# Patient Record
Sex: MALE | Race: WHITE | Employment: OTHER | URBAN - METROPOLITAN AREA
[De-identification: names, ages, dates, MRNs, and addresses within clinical notes are randomized per-mention and may not be internally consistent; named-entity substitution may affect disease eponyms.]

---

## 2017-02-28 PROBLEM — M48.061 SPINAL STENOSIS OF LUMBAR REGION: Status: ACTIVE | Noted: 2017-02-28

## 2017-02-28 PROBLEM — S72.009D CLOSED FRACTURE OF NECK OF FEMUR WITH ROUTINE HEALING: Status: ACTIVE | Noted: 2017-02-28

## 2017-02-28 PROBLEM — S72.142D CLOSED DISPLACED INTERTROCHANTERIC FRACTURE OF LEFT FEMUR WITH ROUTINE HEALING: Status: ACTIVE | Noted: 2017-02-28

## 2017-02-28 PROBLEM — M17.12 PRIMARY OSTEOARTHRITIS OF LEFT KNEE: Status: ACTIVE | Noted: 2017-02-28

## 2017-03-09 PROBLEM — M51.26 BULGING OF LUMBAR INTERVERTEBRAL DISC: Status: ACTIVE | Noted: 2017-03-09

## 2017-03-09 PROBLEM — M51.36 BULGING OF LUMBAR INTERVERTEBRAL DISC: Status: ACTIVE | Noted: 2017-03-09

## 2017-03-30 ENCOUNTER — TELEPHONE (OUTPATIENT)
Dept: PAIN CLINIC | Facility: HOSPITAL | Age: 82
End: 2017-03-30

## 2017-03-30 ENCOUNTER — OFFICE VISIT (OUTPATIENT)
Dept: PAIN CLINIC | Facility: HOSPITAL | Age: 82
End: 2017-03-30
Attending: ANESTHESIOLOGY
Payer: MEDICARE

## 2017-03-30 VITALS
SYSTOLIC BLOOD PRESSURE: 115 MMHG | HEART RATE: 82 BPM | WEIGHT: 148 LBS | DIASTOLIC BLOOD PRESSURE: 60 MMHG | BODY MASS INDEX: 21.92 KG/M2 | HEIGHT: 69 IN

## 2017-03-30 DIAGNOSIS — M48.061 LUMBAR SPINAL STENOSIS: Primary | ICD-10-CM

## 2017-03-30 PROCEDURE — 99201 HC OUTPT EVAL AND MGNT NEW PT LEVEL 1: CPT | Performed by: NURSE PRACTITIONER

## 2017-03-30 NOTE — CHRONIC PAIN
Initial Consultation Note      HISTORY OF PRESENT ILLNESS:  Darron Hamman is a 80year old old male referred to the pain clinic by Dr. Azael Ritchie for lower extremity pain.   Patient reports he has lower extremity discomfort or \"tiredness\" when he walks kevin pain or palpitations   Respiratory:  No current shortness of breath   Gastrointestinal:  No active ulcer  Genitourinary:  Negative  Integumentary :  Negative  Psychiatric:  Negative  Hematologic: No active bleeding  Lymphatic: No current lymphedema  Allerg non-tender  Gait: Broad-based; cane user - Yes  Spine: Kyphosis          MOTOR EXAMINATION    LOWER EXTREMITY      LEFT RIGHT   Iliopsoas 5/5 5/5   Quadriceps 5/5 5/5   Foot DF 5/5 5/5   Foot EHL 5/5 5/5   Gastrocnemius 5/5 5/5   SLR: negative  SIJ tendern  09/23/2014   MPV 9.9 09/23/2014       Lab Results  Component Value Date    09/23/2014   K 4.8 09/23/2014    09/23/2014   CO2 31 09/23/2014   BUN 8 09/23/2014   * 09/23/2014   CA 9.2 09/23/2014     No results found for: PT    IL

## 2017-03-30 NOTE — PROGRESS NOTES
03/30/17:  Patient here as a new patient, ambulating with a cane for c/o lower extremity discomfort when he walks long distances. Seen by Dr. Guero Cobb (see dictation). Patient was not sure what medications he was taking.   This RN called [employer] (

## 2017-05-01 ENCOUNTER — TELEPHONE (OUTPATIENT)
Dept: PAIN CLINIC | Facility: HOSPITAL | Age: 82
End: 2017-05-01

## 2017-05-01 NOTE — TELEPHONE ENCOUNTER
I will route 's response below to Blanca Skelton who sent message to [facility] requesting clearance for patient to hold of Xarelto before epidural

## 2017-05-01 NOTE — TELEPHONE ENCOUNTER
I have not seen Mr Tanika Mcgarry in about 3 yrs - would need to get clearance from his cardiologist or whomever else gave him Xarelto

## 2017-08-14 ENCOUNTER — OFFICE VISIT (OUTPATIENT)
Dept: INTERNAL MEDICINE CLINIC | Facility: CLINIC | Age: 82
End: 2017-08-14

## 2017-08-14 ENCOUNTER — LAB ENCOUNTER (OUTPATIENT)
Dept: LAB | Age: 82
End: 2017-08-14
Attending: INTERNAL MEDICINE
Payer: MEDICARE

## 2017-08-14 VITALS
BODY MASS INDEX: 23.67 KG/M2 | WEIGHT: 159.81 LBS | HEART RATE: 95 BPM | DIASTOLIC BLOOD PRESSURE: 78 MMHG | TEMPERATURE: 98 F | OXYGEN SATURATION: 97 % | HEIGHT: 69 IN | SYSTOLIC BLOOD PRESSURE: 130 MMHG

## 2017-08-14 DIAGNOSIS — R29.898 WEAKNESS OF BOTH LEGS: ICD-10-CM

## 2017-08-14 DIAGNOSIS — M48.061 SPINAL STENOSIS OF LUMBAR REGION: ICD-10-CM

## 2017-08-14 DIAGNOSIS — E78.00 HYPERCHOLESTEROLEMIA: ICD-10-CM

## 2017-08-14 DIAGNOSIS — R29.898 WEAKNESS OF BOTH LOWER EXTREMITIES: Primary | ICD-10-CM

## 2017-08-14 DIAGNOSIS — M40.204 KYPHOSIS OF THORACIC REGION, UNSPECIFIED KYPHOSIS TYPE: ICD-10-CM

## 2017-08-14 DIAGNOSIS — I48.20 CHRONIC ATRIAL FIBRILLATION (HCC): ICD-10-CM

## 2017-08-14 DIAGNOSIS — R29.898 WEAKNESS OF BOTH LOWER EXTREMITIES: ICD-10-CM

## 2017-08-14 DIAGNOSIS — E78.00 HYPERCHOLESTEREMIA: ICD-10-CM

## 2017-08-14 LAB
ALBUMIN SERPL BCP-MCNC: 4 G/DL (ref 3.5–4.8)
ALBUMIN/GLOB SERPL: 1.2 {RATIO} (ref 1–2)
ALP SERPL-CCNC: 61 U/L (ref 32–100)
ALT SERPL-CCNC: 15 U/L (ref 17–63)
ANION GAP SERPL CALC-SCNC: 6 MMOL/L (ref 0–18)
AST SERPL-CCNC: 25 U/L (ref 15–41)
BASOPHILS # BLD: 0 K/UL (ref 0–0.2)
BASOPHILS NFR BLD: 1 %
BILIRUB SERPL-MCNC: 1.3 MG/DL (ref 0.3–1.2)
BILIRUB UR QL: NEGATIVE
BUN SERPL-MCNC: 12 MG/DL (ref 8–20)
BUN/CREAT SERPL: 16 (ref 10–20)
CALCIUM SERPL-MCNC: 9.1 MG/DL (ref 8.5–10.5)
CHLORIDE SERPL-SCNC: 98 MMOL/L (ref 95–110)
CHOLEST SERPL-MCNC: 224 MG/DL (ref 110–200)
CLARITY UR: CLEAR
CO2 SERPL-SCNC: 29 MMOL/L (ref 22–32)
COLOR UR: YELLOW
CREAT SERPL-MCNC: 0.75 MG/DL (ref 0.5–1.5)
EOSINOPHIL # BLD: 0.2 K/UL (ref 0–0.7)
EOSINOPHIL NFR BLD: 3 %
ERYTHROCYTE [DISTWIDTH] IN BLOOD BY AUTOMATED COUNT: 14.1 % (ref 11–15)
GLOBULIN PLAS-MCNC: 3.3 G/DL (ref 2.5–3.7)
GLUCOSE SERPL-MCNC: 100 MG/DL (ref 70–99)
GLUCOSE UR-MCNC: NEGATIVE MG/DL
HCT VFR BLD AUTO: 43.4 % (ref 41–52)
HDLC SERPL-MCNC: 55 MG/DL
HGB BLD-MCNC: 14.5 G/DL (ref 13.5–17.5)
HGB UR QL STRIP.AUTO: NEGATIVE
KETONES UR-MCNC: NEGATIVE MG/DL
LDLC SERPL CALC-MCNC: 159 MG/DL (ref 0–99)
LEUKOCYTE ESTERASE UR QL STRIP.AUTO: NEGATIVE
LYMPHOCYTES # BLD: 1.2 K/UL (ref 1–4)
LYMPHOCYTES NFR BLD: 20 %
MAGNESIUM SERPL-MCNC: 2 MG/DL (ref 1.8–2.5)
MCH RBC QN AUTO: 29.7 PG (ref 27–32)
MCHC RBC AUTO-ENTMCNC: 33.3 G/DL (ref 32–37)
MCV RBC AUTO: 89.2 FL (ref 80–100)
MONOCYTES # BLD: 0.9 K/UL (ref 0–1)
MONOCYTES NFR BLD: 15 %
NEUTROPHILS # BLD AUTO: 3.6 K/UL (ref 1.8–7.7)
NEUTROPHILS NFR BLD: 61 %
NITRITE UR QL STRIP.AUTO: NEGATIVE
NONHDLC SERPL-MCNC: 169 MG/DL
OSMOLALITY UR CALC.SUM OF ELEC: 276 MOSM/KG (ref 275–295)
PH UR: 6 [PH] (ref 5–8)
PLATELET # BLD AUTO: 126 K/UL (ref 140–400)
PMV BLD AUTO: 10.3 FL (ref 7.4–10.3)
POTASSIUM SERPL-SCNC: 5.2 MMOL/L (ref 3.3–5.1)
PROT SERPL-MCNC: 7.3 G/DL (ref 5.9–8.4)
PROT UR-MCNC: NEGATIVE MG/DL
RBC # BLD AUTO: 4.87 M/UL (ref 4.5–5.9)
RBC #/AREA URNS AUTO: 2 /HPF
RBC #/AREA URNS AUTO: 2 /HPF
SODIUM SERPL-SCNC: 133 MMOL/L (ref 136–144)
SP GR UR STRIP: 1.01 (ref 1–1.03)
TRIGL SERPL-MCNC: 49 MG/DL (ref 1–149)
TSH SERPL-ACNC: 4.32 UIU/ML (ref 0.45–5.33)
UROBILINOGEN UR STRIP-ACNC: 2
VIT C UR-MCNC: 20 MG/DL
WBC # BLD AUTO: 6 K/UL (ref 4–11)
WBC #/AREA URNS AUTO: <1 /HPF
WBC #/AREA URNS AUTO: <1 /HPF

## 2017-08-14 PROCEDURE — 99214 OFFICE O/P EST MOD 30 MIN: CPT | Performed by: INTERNAL MEDICINE

## 2017-08-14 PROCEDURE — 80061 LIPID PANEL: CPT

## 2017-08-14 PROCEDURE — 85025 COMPLETE CBC W/AUTO DIFF WBC: CPT

## 2017-08-14 PROCEDURE — 36415 COLL VENOUS BLD VENIPUNCTURE: CPT

## 2017-08-14 PROCEDURE — 83735 ASSAY OF MAGNESIUM: CPT

## 2017-08-14 PROCEDURE — 84443 ASSAY THYROID STIM HORMONE: CPT

## 2017-08-14 PROCEDURE — 82607 VITAMIN B-12: CPT

## 2017-08-14 PROCEDURE — 80053 COMPREHEN METABOLIC PANEL: CPT

## 2017-08-14 PROCEDURE — 81015 MICROSCOPIC EXAM OF URINE: CPT

## 2017-08-14 PROCEDURE — G0463 HOSPITAL OUTPT CLINIC VISIT: HCPCS | Performed by: INTERNAL MEDICINE

## 2017-08-14 PROCEDURE — 81001 URINALYSIS AUTO W/SCOPE: CPT

## 2017-08-14 RX ORDER — METOPROLOL SUCCINATE 25 MG/1
25 TABLET, EXTENDED RELEASE ORAL DAILY
COMMUNITY
Start: 2017-07-27 | End: 2018-03-14

## 2017-08-14 NOTE — PROGRESS NOTES
Herman Rasmussen is a 80year old male. HPI:    Mr Red Cody returns here to see me - Yanet Nurse not seen him since 2014 when he fell and fractured his left hip and was hospitalized at [facility].  He recuperated well from this and both he and his wife (who has now develope mouth daily with food. Disp:  Rfl:    Psyllium (METAMUCIL OR) Take 1 teaspoon daily mixed with water Disp:  Rfl:    docusate sodium (COLACE) 50 MG Oral Cap Take 50 mg by mouth daily.  Disp:  Rfl:    Atorvastatin Calcium (LIPITOR) 10 MG Oral Tab TAKE ONE TAB motor strength of his quads, hamstrings, extensors and flexors of the knees and ankles. ASSESSMENT AND PLAN:   1.  Weakness of both lower extremities - this is most likely due to spinal stenosis but is not extreme on clinical exam. He has shuffling gait

## 2017-08-15 ENCOUNTER — TELEPHONE (OUTPATIENT)
Dept: INTERNAL MEDICINE CLINIC | Facility: CLINIC | Age: 82
End: 2017-08-15

## 2017-08-15 DIAGNOSIS — R29.898 WEAKNESS OF BOTH LOWER EXTREMITIES: Primary | ICD-10-CM

## 2017-08-15 DIAGNOSIS — E78.00 HYPERCHOLESTEREMIA: ICD-10-CM

## 2017-08-15 LAB — VIT B12 SERPL-MCNC: 263 PG/ML (ref 181–914)

## 2017-08-15 NOTE — TELEPHONE ENCOUNTER
Vitamin b12 level added on to specimen from yesterday. Spoke to [place] in reference lab to make sure the specimen gets added on.

## 2017-08-15 NOTE — TELEPHONE ENCOUNTER
Patient called back to verify message. Reiterated Dr. Cornel Shukla message. Patient verbalized understanding. He states he will NOT be going to Dr. Aguilar. He will go back to Dr. Juan Jose Lira and plan to continue PT under his care. To Dr. Young Hathaway.

## 2017-08-15 NOTE — TELEPHONE ENCOUNTER
This is fine. He can continue with Dr Phuong Mays and I think that even though he has done PT for two yrs that it may be beneficial to prevent any falling. If he needs something have him call us.

## 2017-08-15 NOTE — TELEPHONE ENCOUNTER
Called patient and relayed Dr Stanley Lazcano message. Patient verbalized understanding. Patient stated he wanted to talk to Dr Ольга Bray. Asked patient if I could relay a message to him.  Patient wants to let Dr Ольга Bray know that yesterday he went down to Dr Hermine Boas o

## 2017-08-15 NOTE — TELEPHONE ENCOUNTER
Can you see if they can add a B 12 level to blood drawn yesterday? If not, I would like him to get drawn.

## 2017-08-25 ENCOUNTER — TELEPHONE (OUTPATIENT)
Dept: INTERNAL MEDICINE CLINIC | Facility: CLINIC | Age: 82
End: 2017-08-25

## 2017-08-25 NOTE — TELEPHONE ENCOUNTER
I phoned Mr Dorothy Riley with low B 12 level - he will start oral B 12 1000 micrograms daily and get repeat B 12 level in 2 months.

## 2017-08-28 ENCOUNTER — TELEPHONE (OUTPATIENT)
Dept: INTERNAL MEDICINE CLINIC | Facility: CLINIC | Age: 82
End: 2017-08-28

## 2017-09-06 ENCOUNTER — TELEPHONE (OUTPATIENT)
Dept: INTERNAL MEDICINE CLINIC | Facility: CLINIC | Age: 82
End: 2017-09-06

## 2017-09-08 RX ORDER — MELATONIN
1000 DAILY
Qty: 90 TABLET | Refills: 3 | Status: SHIPPED | OUTPATIENT
Start: 2017-09-08 | End: 2018-02-13

## 2017-09-08 NOTE — TELEPHONE ENCOUNTER
Dr. Lala Prudent message relayed to pt who verbalized understanding. Pt prefers to get all medication thru pharmacy. B12 eRx'd.

## 2017-09-15 ENCOUNTER — OFFICE VISIT (OUTPATIENT)
Dept: NEUROLOGY | Facility: CLINIC | Age: 82
End: 2017-09-15

## 2017-09-15 DIAGNOSIS — M54.16 LUMBAR RADICULOPATHY: Primary | ICD-10-CM

## 2017-09-15 PROCEDURE — 95886 MUSC TEST DONE W/N TEST COMP: CPT | Performed by: OTHER

## 2017-09-15 PROCEDURE — 95908 NRV CNDJ TST 3-4 STUDIES: CPT | Performed by: OTHER

## 2017-09-15 NOTE — PROCEDURES
[address]  Phone: [phone]  Fax: [phone] REPORT          Patient: Lien Heard YOB: 1925  Patient ID: [id number] Hand Dominance: addi MRI of the lumbar spine report reviewed    [facility] conduction study reviewed a distal axonal sensorimotor peripheral neuropathy. No evidence for a left lumbar sacral radiculopathy, myopathy, mononeuropathy, plexopathy.                      Motor NCS

## 2017-09-26 PROBLEM — G62.89 PERIPHERAL AXONAL NEUROPATHY: Status: ACTIVE | Noted: 2017-09-26

## 2017-10-05 ENCOUNTER — TELEPHONE (OUTPATIENT)
Dept: NEUROLOGY | Facility: CLINIC | Age: 82
End: 2017-10-05

## 2017-10-05 NOTE — TELEPHONE ENCOUNTER
Referral for consult and EMG in pending status.  Confirm consult ordered  Office closed until 2pm for lunch

## 2017-10-06 ENCOUNTER — OFFICE VISIT (OUTPATIENT)
Dept: NEUROLOGY | Facility: CLINIC | Age: 82
End: 2017-10-06

## 2017-10-06 DIAGNOSIS — M54.16 LUMBAR RADICULOPATHY: ICD-10-CM

## 2017-10-06 DIAGNOSIS — G62.9 NEUROPATHY: Primary | ICD-10-CM

## 2017-10-06 PROCEDURE — 95886 MUSC TEST DONE W/N TEST COMP: CPT | Performed by: OTHER

## 2017-10-06 PROCEDURE — 95908 NRV CNDJ TST 3-4 STUDIES: CPT | Performed by: OTHER

## 2017-10-06 NOTE — PROCEDURES
[address]  Phone: [phone]  Fax: [phone] REPORT          Patient: Nidia Harrington YOB: 1925  Patient ID: [id number] Hand Dominance: addi EMG–nerve conduction study of the right lower extremity confirms that distal axonal sensorimotor peripheral neuropathy. No evidence for right lumbar sacral radiculopathy. No evidence for myopathy, mononeuropathy.     I recommend that he follow-up with

## 2017-10-10 ENCOUNTER — TELEPHONE (OUTPATIENT)
Dept: INTERNAL MEDICINE CLINIC | Facility: CLINIC | Age: 82
End: 2017-10-10

## 2017-10-10 DIAGNOSIS — E03.9 ACQUIRED HYPOTHYROIDISM: ICD-10-CM

## 2017-10-10 DIAGNOSIS — G62.9 PERIPHERAL POLYNEUROPATHY: Primary | ICD-10-CM

## 2017-10-10 NOTE — TELEPHONE ENCOUNTER
Dr Larisa Felder did an analysis of right leg muscle on 10/5, Dr Larisa Felder sent results to Dr CASTRO to review.     Pt is checking to see if the results are received pt would like to discuss these results,   please call pt [phone]   Tasked to nursing

## 2017-10-11 NOTE — TELEPHONE ENCOUNTER
Pt notified EMG from DR Dario Amos shows neuropathy  No etiology  / DR ROCHA   EMG shows fair amount of arthritis LS spine   Some spinal stenosis/ DR ROCHA  To have labwork done / dr Venice Mason  Pt to get labs th/ or FR   Will call and make appt to disc   All tests soon

## 2017-10-11 NOTE — TELEPHONE ENCOUNTER
EMG report reviewed from Dr. Collette Valdes. He noted that patient has a neuropathy. Recommended some blood work that may contribute to this. Labs in place. Indicated that the EMG results do not give the etiology for his walking problem. I also noted that there is an MRI report from Dr. Martine Velasco that shows a fair amount of arthritis in the lumbar spine. Also some spinal stenosis.

## 2017-10-12 ENCOUNTER — LAB ENCOUNTER (OUTPATIENT)
Dept: LAB | Age: 82
End: 2017-10-12
Attending: INTERNAL MEDICINE
Payer: MEDICARE

## 2017-10-12 DIAGNOSIS — G62.9 PERIPHERAL POLYNEUROPATHY: ICD-10-CM

## 2017-10-12 DIAGNOSIS — E03.9 ACQUIRED HYPOTHYROIDISM: ICD-10-CM

## 2017-10-12 PROCEDURE — 86235 NUCLEAR ANTIGEN ANTIBODY: CPT

## 2017-10-12 PROCEDURE — 86039 ANTINUCLEAR ANTIBODIES (ANA): CPT

## 2017-10-12 PROCEDURE — 82607 VITAMIN B-12: CPT

## 2017-10-12 PROCEDURE — 86038 ANTINUCLEAR ANTIBODIES: CPT

## 2017-10-12 PROCEDURE — 85652 RBC SED RATE AUTOMATED: CPT

## 2017-10-12 PROCEDURE — 84165 PROTEIN E-PHORESIS SERUM: CPT

## 2017-10-12 PROCEDURE — 80048 BASIC METABOLIC PNL TOTAL CA: CPT

## 2017-10-12 PROCEDURE — 84443 ASSAY THYROID STIM HORMONE: CPT

## 2017-10-12 PROCEDURE — 84439 ASSAY OF FREE THYROXINE: CPT

## 2017-10-12 PROCEDURE — 85025 COMPLETE CBC W/AUTO DIFF WBC: CPT

## 2017-10-12 PROCEDURE — 36415 COLL VENOUS BLD VENIPUNCTURE: CPT

## 2017-10-12 PROCEDURE — 86225 DNA ANTIBODY NATIVE: CPT

## 2017-10-26 NOTE — TELEPHONE ENCOUNTER
Please let patient know that I received his results that Dr. Tamar Mclaughlin wanted him to have for his neuropathy. They mostly look good except for 1 lab abnormality called the TENNILLE. That is elevated. I am not sure why. I do not think that is related to his neuropathy but to be on the safe side the doctor that would know more about this is a rheumatologist.  Please refer him to either Dr. Tarun Roman or Dr. Griselda Mcnulty. The may take about 3 weeks to see. I do not think this is very urgent. I do not think anything will come of it only so that they can comment that the lab abnormality is not related to neuropathy or anything else. Thank you.

## 2017-10-26 NOTE — TELEPHONE ENCOUNTER
Spoke with patient and relayed message from Dr. Christelle Ortiz. Patient verbalized understanding. Clarification provided as needed. Office phone for rheumatology given to patient. By the end of conversation, however, patient decided he would wait to call Dr. Shawna Benton. States he has been seeing so many doctors lately and is still waiting to hear back from a few. Also states he does not want more medical bills and that his mobility is limited, so that makes it hard to get to all of these appts. Patient reports he is still waiting to hear back from Dr. Aidan Cabrera and Dr. Lynnette Bernard (unsure of first name), so he opts to wait on rheumatology appt until he touches base with these doctors. To Dr. Anabela Lopez.

## 2017-11-09 ENCOUNTER — TELEPHONE (OUTPATIENT)
Dept: PAIN CLINIC | Facility: HOSPITAL | Age: 82
End: 2017-11-09

## 2017-11-09 NOTE — TELEPHONE ENCOUNTER
Patient not sure if he should be receiving an injection at the pain center since he is not experiencing any pain. He will be contacting Dr. Megha Fofana for clarification on where he should be going. Made appointment for patient on 11/16.  He is aware he wi

## 2017-11-16 ENCOUNTER — OFFICE VISIT (OUTPATIENT)
Dept: PAIN CLINIC | Facility: HOSPITAL | Age: 82
End: 2017-11-16
Attending: ANESTHESIOLOGY
Payer: MEDICARE

## 2017-11-16 VITALS
BODY MASS INDEX: 23.55 KG/M2 | DIASTOLIC BLOOD PRESSURE: 64 MMHG | WEIGHT: 159 LBS | HEIGHT: 69 IN | RESPIRATION RATE: 18 BRPM | SYSTOLIC BLOOD PRESSURE: 122 MMHG

## 2017-11-16 DIAGNOSIS — G62.89 PERIPHERAL AXONAL NEUROPATHY: Primary | ICD-10-CM

## 2017-11-16 DIAGNOSIS — M48.062 SPINAL STENOSIS OF LUMBAR REGION WITH NEUROGENIC CLAUDICATION: ICD-10-CM

## 2017-11-16 PROCEDURE — 99211 OFF/OP EST MAY X REQ PHY/QHP: CPT

## 2017-11-16 NOTE — CHRONIC PAIN
Initial Consultation Note      HISTORY OF PRESENT ILLNESS:  Wilfredo Thomas is a 80year old old male referred to the pain clinic by Dr. Nely Vogt for lower extremity pain.   Patient reports he has lower extremity discomfort or \"tiredness\" when he walks long d Bowel/Bladder Incontinence: as above  Coughing/sneezing/straining does not exacerbate the pain.   Numbness/tingling: as above  Weakness: as above  Weight Loss: Negative   Fever: Negative   Cardiovascular:  No current chest pain or palpitations   Respirato None on file       ADVANCE CARE PLANNING:  Advance Care Plan NOT discussed.     PHYSICAL EXAMINATION:   11/16/17  0755   BP: 122/64   Resp: 18   Weight: 159 lb (72.1 kg)   Height: 5' 9\" (1.753 m)     General: Alert and oriented x3  Affect:  NAD  Head: no stenosis. L5-S1: disc bulge and end plate spurring present. There is narrowing of the lateral recesses and severe bilateral foraminal stenosis.     LABS:    Lab Results  Component Value Date   WBC 4.3 10/12/2017   RBC 4.74 10/12/2017   HGB 14.1 10/12/2017

## 2017-11-16 NOTE — PROGRESS NOTES
03/30/17:  Patient here as a new patient, ambulating with a cane for c/o lower extremity discomfort when he walks long distances. Seen by Dr. Jorge Garcia (see dictation). Patient was not sure what medications he was taking.   This RN called [employer] (

## 2017-11-20 ENCOUNTER — TELEPHONE (OUTPATIENT)
Dept: INTERNAL MEDICINE CLINIC | Facility: CLINIC | Age: 82
End: 2017-11-20

## 2017-11-20 NOTE — TELEPHONE ENCOUNTER
To Dr. Neville lama - see below - onset: 2 years ago since broken hip - low strength. \"No pain but handicapped walking around\". Thinks it may be spinal stenosis, nerves affecting muscles. Would like to stand up straight and walk better.   Pt has tried PT

## 2017-11-20 NOTE — TELEPHONE ENCOUNTER
Pt. States he has trouble walking and thinks he's going to fall over. Pt states Dr. Sidra Solano wants him to get a blood test because he thinks the nerves in his legs are acting up   Ph.  # [phone]    Routed to clinical

## 2017-11-24 ENCOUNTER — OFFICE VISIT (OUTPATIENT)
Dept: INTERNAL MEDICINE CLINIC | Facility: CLINIC | Age: 82
End: 2017-11-24

## 2017-11-24 VITALS
HEART RATE: 70 BPM | WEIGHT: 158 LBS | BODY MASS INDEX: 23.4 KG/M2 | RESPIRATION RATE: 18 BRPM | SYSTOLIC BLOOD PRESSURE: 126 MMHG | TEMPERATURE: 98 F | DIASTOLIC BLOOD PRESSURE: 64 MMHG | OXYGEN SATURATION: 96 % | HEIGHT: 69 IN

## 2017-11-24 DIAGNOSIS — I48.20 CHRONIC ATRIAL FIBRILLATION (HCC): ICD-10-CM

## 2017-11-24 DIAGNOSIS — M48.061 SPINAL STENOSIS OF LUMBAR REGION, UNSPECIFIED WHETHER NEUROGENIC CLAUDICATION PRESENT: ICD-10-CM

## 2017-11-24 DIAGNOSIS — R29.898 WEAKNESS OF BOTH LEGS: Primary | ICD-10-CM

## 2017-11-24 DIAGNOSIS — E78.00 HYPERCHOLESTEROLEMIA: ICD-10-CM

## 2017-11-24 PROCEDURE — G0463 HOSPITAL OUTPT CLINIC VISIT: HCPCS | Performed by: INTERNAL MEDICINE

## 2017-11-24 PROCEDURE — 99214 OFFICE O/P EST MOD 30 MIN: CPT | Performed by: INTERNAL MEDICINE

## 2017-11-24 RX ORDER — TOBRAMYCIN AND DEXAMETHASONE 3; 1 MG/ML; MG/ML
SUSPENSION/ DROPS OPHTHALMIC
COMMUNITY
Start: 2017-11-10 | End: 2017-11-24

## 2017-11-24 RX ORDER — INFLUENZA A VIRUSA/MICHIGAN/45/2015 X-275 (H1N1) ANTIGEN (FORMALDEHYDE INACTIVATED), INFLUENZA A VIRUS A/HONG KONG/4801/2014 X-263B (H3N2) ANTIGEN (FORMALDEHYDE INACTIVATED), AND INFLUENZA B VIRUS B/BRISBANE/60/2008 ANTIGEN (FORMALDEHYDE INACTIVATED) 60; 60; 60 UG/.5ML; UG/.5ML; UG/.5ML
INJECTION, SUSPENSION INTRAMUSCULAR
Refills: 0 | COMMUNITY
Start: 2017-10-13 | End: 2017-11-24

## 2018-02-10 NOTE — PROGRESS NOTES
Pamella Ny is a 80year old male. HPI:   1. Weakness of both legs  He is here today with his MD son from [place] for re-evaluation. He is not happy with the way he is walking.  He has not fallen recently since fracturing his hip in 2014 and uses a walker of Atrial fibrillation (HCC)    • High cholesterol    • IBS (irritable bowel syndrome)    • [facility]      Social History:  Smoking status: Former Smoker                                                              Packs/day: 0.00      Years: Hypercholesterolemia      4. Chronic atrial fibrillation (HCC)  Continue Xarelto    I had a thorough discussion with the patient and his son and they agree on continued observation and no aggressive measures.      The patient indicates understanding

## 2018-07-31 ENCOUNTER — HOSPITAL ENCOUNTER (OUTPATIENT)
Dept: GENERAL RADIOLOGY | Age: 83
Discharge: HOME | End: 2018-07-31
Attending: INTERNAL MEDICINE
Payer: MEDICARE

## 2018-07-31 ENCOUNTER — HOSPITAL ENCOUNTER (OUTPATIENT)
Dept: GENERAL RADIOLOGY | Age: 83
Discharge: HOME | End: 2018-07-31
Attending: INTERNAL MEDICINE | Admitting: INTERNAL MEDICINE
Payer: MEDICARE

## 2018-07-31 ENCOUNTER — OFFICE VISIT (OUTPATIENT)
Dept: INTERNAL MEDICINE CLINIC | Facility: CLINIC | Age: 83
End: 2018-07-31
Payer: MEDICARE

## 2018-07-31 VITALS
RESPIRATION RATE: 16 BRPM | WEIGHT: 161 LBS | OXYGEN SATURATION: 95 % | BODY MASS INDEX: 24.4 KG/M2 | DIASTOLIC BLOOD PRESSURE: 68 MMHG | HEIGHT: 68 IN | SYSTOLIC BLOOD PRESSURE: 110 MMHG | HEART RATE: 75 BPM | TEMPERATURE: 98 F

## 2018-07-31 DIAGNOSIS — M48.061 SPINAL STENOSIS OF LUMBAR REGION, UNSPECIFIED WHETHER NEUROGENIC CLAUDICATION PRESENT: ICD-10-CM

## 2018-07-31 DIAGNOSIS — R07.1 CHEST PAIN ON BREATHING: ICD-10-CM

## 2018-07-31 DIAGNOSIS — R07.1 CHEST PAIN ON BREATHING: Primary | ICD-10-CM

## 2018-07-31 DIAGNOSIS — I48.20 CHRONIC ATRIAL FIBRILLATION (HCC): ICD-10-CM

## 2018-07-31 PROCEDURE — G0463 HOSPITAL OUTPT CLINIC VISIT: HCPCS | Performed by: INTERNAL MEDICINE

## 2018-07-31 PROCEDURE — 99214 OFFICE O/P EST MOD 30 MIN: CPT | Performed by: INTERNAL MEDICINE

## 2018-07-31 PROCEDURE — 71100 X-RAY EXAM RIBS UNI 2 VIEWS: CPT | Performed by: INTERNAL MEDICINE

## 2018-07-31 PROCEDURE — 71046 X-RAY EXAM CHEST 2 VIEWS: CPT | Performed by: INTERNAL MEDICINE

## 2018-07-31 NOTE — PROGRESS NOTES
Stefan Croft is a 80year old male. HPI:   [facility], he landed on his left side and shoulder and no head injury.  Since then he he had had pain in the left anterior upper chest destinee with breathing or ta tobacco: Never Used                      Comment: 4 cigarettes a day  Alcohol use:  Yes              Comment: hard liquor 1 glass daily       REVIEW OF SYSTEMS:   GENERAL HEALTH: feels well otherwise  SKIN: denies any unusual skin lesions or rashes  RESPIRA

## 2018-08-01 ENCOUNTER — TELEPHONE (OUTPATIENT)
Dept: INTERNAL MEDICINE CLINIC | Facility: CLINIC | Age: 83
End: 2018-08-01

## 2018-08-01 NOTE — TELEPHONE ENCOUNTER
Tell him he has fracture of left 3rd rib - will probably take another month to heal. Heating pad and Tylenol for pain    CXR normal

## 2018-08-23 ENCOUNTER — TELEPHONE (OUTPATIENT)
Dept: INTERNAL MEDICINE CLINIC | Facility: CLINIC | Age: 83
End: 2018-08-23

## 2018-08-23 NOTE — TELEPHONE ENCOUNTER
Please call patient this morning. He had called yesterday and spoke to [address] [place] [address]. He had a difficult time hearing. It appears he was trying to tell us that there was a mechanical problem with his hearing aid.   Not any ear pain or foreign body in the ear

## 2018-08-23 NOTE — TELEPHONE ENCOUNTER
Patient called and he stated he would like to cancel today's appointment scheduled for 11:30am. Pt stated he will not be coming in to office and then hung up.

## 2018-10-04 PROBLEM — S62.102A CLOSED FRACTURE OF LEFT WRIST: Status: ACTIVE | Noted: 2018-10-04

## 2018-10-08 PROBLEM — S52.612A CLOSED DISPLACED FRACTURE OF STYLOID PROCESS OF LEFT ULNA: Status: ACTIVE | Noted: 2018-10-08

## 2018-10-08 PROBLEM — S52.502D CLOSED FRACTURE OF LOWER END OF LEFT RADIUS WITH ROUTINE HEALING: Status: ACTIVE | Noted: 2018-10-08

## 2018-11-20 PROBLEM — S52.502A CLOSED FRACTURE OF LEFT DISTAL RADIUS: Status: ACTIVE | Noted: 2018-10-08

## 2019-04-22 PROBLEM — S62.102A CLOSED FRACTURE OF LEFT WRIST: Status: RESOLVED | Noted: 2018-10-04 | Resolved: 2019-04-22

## 2019-04-22 PROBLEM — M51.36 BULGING OF LUMBAR INTERVERTEBRAL DISC: Status: RESOLVED | Noted: 2017-03-09 | Resolved: 2019-04-22

## 2019-04-22 PROBLEM — S52.502A CLOSED FRACTURE OF LEFT DISTAL RADIUS: Status: RESOLVED | Noted: 2018-10-08 | Resolved: 2019-04-22

## 2019-04-22 PROBLEM — S72.142D CLOSED DISPLACED INTERTROCHANTERIC FRACTURE OF LEFT FEMUR WITH ROUTINE HEALING: Status: RESOLVED | Noted: 2017-02-28 | Resolved: 2019-04-22

## 2019-04-22 PROBLEM — S52.612A CLOSED DISPLACED FRACTURE OF STYLOID PROCESS OF LEFT ULNA: Status: RESOLVED | Noted: 2018-10-08 | Resolved: 2019-04-22

## 2019-04-22 PROBLEM — R07.1 CHEST PAIN ON BREATHING: Status: RESOLVED | Noted: 2018-07-31 | Resolved: 2019-04-22

## 2019-04-22 PROBLEM — M17.12 PRIMARY OSTEOARTHRITIS OF LEFT KNEE: Status: RESOLVED | Noted: 2017-02-28 | Resolved: 2019-04-22

## 2019-04-22 PROBLEM — M51.26 BULGING OF LUMBAR INTERVERTEBRAL DISC: Status: RESOLVED | Noted: 2017-03-09 | Resolved: 2019-04-22

## 2019-04-22 PROBLEM — S72.009D CLOSED FRACTURE OF NECK OF FEMUR WITH ROUTINE HEALING: Status: RESOLVED | Noted: 2017-02-28 | Resolved: 2019-04-22

## 2020-01-26 ENCOUNTER — HOSPITAL (OUTPATIENT)
Dept: OTHER | Age: 85
End: 2020-01-26

## 2020-01-27 ENCOUNTER — HOSPITAL (OUTPATIENT)
Dept: OTHER | Age: 85
End: 2020-01-27

## 2020-01-27 PROCEDURE — 99221 1ST HOSP IP/OBS SF/LOW 40: CPT | Performed by: INTERNAL MEDICINE

## 2020-01-28 PROCEDURE — X1094 NO CHARGE VISIT: HCPCS | Performed by: SPECIALIST/TECHNOLOGIST, OTHER

## 2020-01-28 PROCEDURE — 99232 SBSQ HOSP IP/OBS MODERATE 35: CPT | Performed by: INTERNAL MEDICINE

## 2020-01-28 PROCEDURE — 93306 TTE W/DOPPLER COMPLETE: CPT | Performed by: INTERNAL MEDICINE

## 2020-01-29 PROCEDURE — 99232 SBSQ HOSP IP/OBS MODERATE 35: CPT | Performed by: INTERNAL MEDICINE

## 2020-01-31 ENCOUNTER — DOCUMENTATION (OUTPATIENT)
Dept: CARDIOLOGY | Age: 85
End: 2020-01-31

## 2020-01-31 LAB — PATHOLOGIST NAME: NORMAL

## 2024-08-28 NOTE — TELEPHONE ENCOUNTER
Per Nephro, patient still getting IV Lasix and electrolyte replacement.   Patient is from home with his wife and is independent at home.   He was provided with Remote Patient Monitoring information.  CM will continue to follow should any additional needs arise.    Please advise - to DR. CASTRO

## (undated) NOTE — LETTER
[id number] [facility]  [address], [address]  [address] [place] [address] [phone]        Dear Manuel Evans,      I had the pleasure of seeing your patient, Roxana Mckeon on 9/15/2017.      Below please find a summary of our vis

## (undated) NOTE — LETTER
[address] [facility]  [address], [address]  [address] [place] [address] [phone]        Dear Sommer Gregg,      I had the pleasure of seeing your patient, Yumiko Sheppard on 10/6/2017.      Below please find a summary of our vis

## (undated) NOTE — MR AVS SNAPSHOT
[address]  [facility] [address] [address]  [phone]  [phone]               Thank you for choosing us for your health care visit with June Jensen MD.  We are glad to serve you and happy to provide you wi Visit [email or web address] to learn more.            Visit [facility] online at  [email or web address]